# Patient Record
Sex: MALE | Race: WHITE | Employment: OTHER | URBAN - METROPOLITAN AREA
[De-identification: names, ages, dates, MRNs, and addresses within clinical notes are randomized per-mention and may not be internally consistent; named-entity substitution may affect disease eponyms.]

---

## 2023-04-28 ENCOUNTER — TELEPHONE (OUTPATIENT)
Dept: PRIMARY CARE | Facility: CLINIC | Age: 73
End: 2023-04-28
Payer: COMMERCIAL

## 2023-08-08 ENCOUNTER — OFFICE VISIT (OUTPATIENT)
Dept: PRIMARY CARE | Facility: CLINIC | Age: 73
End: 2023-08-08
Payer: COMMERCIAL

## 2023-08-08 VITALS
HEART RATE: 82 BPM | RESPIRATION RATE: 16 BRPM | DIASTOLIC BLOOD PRESSURE: 70 MMHG | SYSTOLIC BLOOD PRESSURE: 145 MMHG | BODY MASS INDEX: 26.03 KG/M2 | HEIGHT: 70 IN | WEIGHT: 181.8 LBS

## 2023-08-08 DIAGNOSIS — Z00.00 ROUTINE GENERAL MEDICAL EXAMINATION AT HEALTH CARE FACILITY: ICD-10-CM

## 2023-08-08 DIAGNOSIS — I10 BENIGN ESSENTIAL HTN: Primary | ICD-10-CM

## 2023-08-08 DIAGNOSIS — Z13.89 ENCOUNTER FOR SCREENING FOR OTHER DISORDER: ICD-10-CM

## 2023-08-08 PROCEDURE — 3078F DIAST BP <80 MM HG: CPT | Performed by: INTERNAL MEDICINE

## 2023-08-08 PROCEDURE — 1170F FXNL STATUS ASSESSED: CPT | Performed by: INTERNAL MEDICINE

## 2023-08-08 PROCEDURE — 3077F SYST BP >= 140 MM HG: CPT | Performed by: INTERNAL MEDICINE

## 2023-08-08 PROCEDURE — 99213 OFFICE O/P EST LOW 20 MIN: CPT | Performed by: INTERNAL MEDICINE

## 2023-08-08 PROCEDURE — 1160F RVW MEDS BY RX/DR IN RCRD: CPT | Performed by: INTERNAL MEDICINE

## 2023-08-08 PROCEDURE — 1126F AMNT PAIN NOTED NONE PRSNT: CPT | Performed by: INTERNAL MEDICINE

## 2023-08-08 PROCEDURE — 1036F TOBACCO NON-USER: CPT | Performed by: INTERNAL MEDICINE

## 2023-08-08 PROCEDURE — 1159F MED LIST DOCD IN RCRD: CPT | Performed by: INTERNAL MEDICINE

## 2023-08-08 PROCEDURE — G0442 ANNUAL ALCOHOL SCREEN 15 MIN: HCPCS | Performed by: INTERNAL MEDICINE

## 2023-08-08 PROCEDURE — G0439 PPPS, SUBSEQ VISIT: HCPCS | Performed by: INTERNAL MEDICINE

## 2023-08-08 RX ORDER — HYDROCHLOROTHIAZIDE 12.5 MG/1
1 TABLET ORAL DAILY
COMMUNITY
Start: 2014-02-19 | End: 2023-08-08 | Stop reason: ALTCHOICE

## 2023-08-08 RX ORDER — LOSARTAN POTASSIUM 50 MG/1
50 TABLET ORAL DAILY
Qty: 90 TABLET | Refills: 1 | Status: SHIPPED | OUTPATIENT
Start: 2023-08-08 | End: 2023-10-27 | Stop reason: SDUPTHER

## 2023-08-08 ASSESSMENT — ACTIVITIES OF DAILY LIVING (ADL)
MANAGING_FINANCES: INDEPENDENT
DOING_HOUSEWORK: INDEPENDENT
DRESSING: INDEPENDENT
TAKING_MEDICATION: INDEPENDENT
BATHING: INDEPENDENT
GROCERY_SHOPPING: INDEPENDENT

## 2023-08-08 ASSESSMENT — PATIENT HEALTH QUESTIONNAIRE - PHQ9
1. LITTLE INTEREST OR PLEASURE IN DOING THINGS: NOT AT ALL
SUM OF ALL RESPONSES TO PHQ9 QUESTIONS 1 AND 2: 0
2. FEELING DOWN, DEPRESSED OR HOPELESS: NOT AT ALL

## 2023-08-08 ASSESSMENT — LIFESTYLE VARIABLES
AUDIT-C TOTAL SCORE: 3
HOW MANY STANDARD DRINKS CONTAINING ALCOHOL DO YOU HAVE ON A TYPICAL DAY: 1 OR 2
HOW OFTEN DO YOU HAVE SIX OR MORE DRINKS ON ONE OCCASION: NEVER
SKIP TO QUESTIONS 9-10: 1
HOW OFTEN DO YOU HAVE A DRINK CONTAINING ALCOHOL: 2-3 TIMES A WEEK

## 2023-08-08 ASSESSMENT — ENCOUNTER SYMPTOMS
OCCASIONAL FEELINGS OF UNSTEADINESS: 0
LOSS OF SENSATION IN FEET: 0

## 2023-08-08 ASSESSMENT — PAIN SCALES - GENERAL: PAINLEVEL: 0-NO PAIN

## 2023-08-08 NOTE — PATIENT INSTRUCTIONS
We are stopping Hydrochlorothiazide.  Begin taking losartan 50 mg one every day with food.     Get blood test done after fasting overnight.  The  lab is on the first floor.  Lab hours are 7 am to 4 pm Mon-Fri and 8am to Noon on Saturday.  No appt is needed.  Orders are entered into the system so you do not need a paper order.     See me again in 3 months.

## 2023-08-08 NOTE — PROGRESS NOTES
"Subjective   Reason for Visit: Jayme Mojica is an 72 y.o. male here for a Medicare Wellness visit.          Reviewed all medications by prescribing practitioner or clinical pharmacist (such as prescriptions, OTCs, herbal therapies and supplements) and documented in the medical record.    HPI    He is on Medicare now.    He had skin cancer surgery  He says his blood pressure \"was through the roof\" when he went for the surgery  He said he had stopped taking his hydrochlorothiazide because he was having frequency of urine    He has lost 10 pounds  He stopped drinking beer     He is still recovering from his elbow replacemnt  He is still doing physical therapy  He has been  Patient Care Team:  Liya Nixon DO as PCP - General  Liya Nixon DO as PCP - [employer] PCP     Review of Systems    Objective   Vitals:  /70 (BP Location: Right arm, Patient Position: Sitting, BP Cuff Size: Adult)   Pulse 82   Resp 16   Ht 1.765 m (5' 9.5\")   Wt 82.5 kg (181 lb 12.8 oz)   BMI 26.46 kg/m²       Physical Exam  Visit Vitals  /70 (BP Location: Right arm, Patient Position: Sitting, BP Cuff Size: Adult)   Pulse 82   Resp 16   Ht 1.765 m (5' 9.5\")   Wt 82.5 kg (181 lb 12.8 oz)   BMI 26.46 kg/m²   Smoking Status Former   BSA 2.01 m²      GEN: NAD  HEENT: normal  NECK: no cervical  adenopathy, no thyroid enlargment  LUNGS: CTAB  CV: reg S1/S2 no murmurs  ABD: soft. Nontender. No mass or organomegaly.   EXT: no leg edema   Assessment/Plan   Problem List Items Addressed This Visit    None    Encounter for preventive health care visit: subsequent medicare wellness  visit completed today.    He is due for labs which were ordered.  At this point in time he wishes to remain a full code.     2. Benign hypertension: discontinued hydrochlorothiazide. Begin losartan. Check CMP and lipids. See me again in 3 months.   "

## 2023-08-25 ENCOUNTER — LAB (OUTPATIENT)
Dept: LAB | Facility: LAB | Age: 73
End: 2023-08-25
Payer: COMMERCIAL

## 2023-08-25 DIAGNOSIS — I10 BENIGN ESSENTIAL HTN: ICD-10-CM

## 2023-08-25 LAB
ALANINE AMINOTRANSFERASE (SGPT) (U/L) IN SER/PLAS: 12 U/L (ref 10–52)
ALBUMIN (G/DL) IN SER/PLAS: 4.3 G/DL (ref 3.4–5)
ALKALINE PHOSPHATASE (U/L) IN SER/PLAS: 105 U/L (ref 33–136)
ANION GAP IN SER/PLAS: 12 MMOL/L (ref 10–20)
ASPARTATE AMINOTRANSFERASE (SGOT) (U/L) IN SER/PLAS: 12 U/L (ref 9–39)
BILIRUBIN TOTAL (MG/DL) IN SER/PLAS: 0.8 MG/DL (ref 0–1.2)
CALCIUM (MG/DL) IN SER/PLAS: 9.2 MG/DL (ref 8.6–10.6)
CARBON DIOXIDE, TOTAL (MMOL/L) IN SER/PLAS: 28 MMOL/L (ref 21–32)
CHLORIDE (MMOL/L) IN SER/PLAS: 107 MMOL/L (ref 98–107)
CHOLESTEROL (MG/DL) IN SER/PLAS: 197 MG/DL (ref 0–199)
CHOLESTEROL IN HDL (MG/DL) IN SER/PLAS: 43.8 MG/DL
CHOLESTEROL/HDL RATIO: 4.5
CREATININE (MG/DL) IN SER/PLAS: 0.86 MG/DL (ref 0.5–1.3)
GFR MALE: >90 ML/MIN/1.73M2
GLUCOSE (MG/DL) IN SER/PLAS: 87 MG/DL (ref 74–99)
LDL: 118 MG/DL (ref 0–99)
POTASSIUM (MMOL/L) IN SER/PLAS: 4.4 MMOL/L (ref 3.5–5.3)
PROTEIN TOTAL: 6.9 G/DL (ref 6.4–8.2)
SODIUM (MMOL/L) IN SER/PLAS: 143 MMOL/L (ref 136–145)
TRIGLYCERIDE (MG/DL) IN SER/PLAS: 177 MG/DL (ref 0–149)
UREA NITROGEN (MG/DL) IN SER/PLAS: 19 MG/DL (ref 6–23)
VLDL: 35 MG/DL (ref 0–40)

## 2023-08-25 PROCEDURE — 80061 LIPID PANEL: CPT

## 2023-08-25 PROCEDURE — 36415 COLL VENOUS BLD VENIPUNCTURE: CPT

## 2023-08-25 PROCEDURE — 80053 COMPREHEN METABOLIC PANEL: CPT

## 2023-10-27 DIAGNOSIS — I10 BENIGN ESSENTIAL HTN: ICD-10-CM

## 2023-10-27 RX ORDER — LOSARTAN POTASSIUM 50 MG/1
50 TABLET ORAL DAILY
Qty: 90 TABLET | Refills: 1 | Status: SHIPPED | OUTPATIENT
Start: 2023-10-27 | End: 2023-11-08 | Stop reason: ALTCHOICE

## 2023-10-27 NOTE — TELEPHONE ENCOUNTER
Med refill    losartan (Cozaar) 50 mg tablet    Take 1 tablet (50 mg) by mouth once daily.     90 days    [facility] - [address]    Will run out before appointment    BN

## 2023-11-08 ENCOUNTER — OFFICE VISIT (OUTPATIENT)
Dept: PRIMARY CARE | Facility: CLINIC | Age: 73
End: 2023-11-08
Payer: COMMERCIAL

## 2023-11-08 VITALS
BODY MASS INDEX: 26.45 KG/M2 | HEIGHT: 70 IN | WEIGHT: 184.8 LBS | DIASTOLIC BLOOD PRESSURE: 80 MMHG | HEART RATE: 67 BPM | SYSTOLIC BLOOD PRESSURE: 150 MMHG

## 2023-11-08 DIAGNOSIS — I10 BENIGN ESSENTIAL HTN: Primary | ICD-10-CM

## 2023-11-08 DIAGNOSIS — R05.3 CHRONIC COUGH: ICD-10-CM

## 2023-11-08 PROCEDURE — 1036F TOBACCO NON-USER: CPT | Performed by: INTERNAL MEDICINE

## 2023-11-08 PROCEDURE — 1159F MED LIST DOCD IN RCRD: CPT | Performed by: INTERNAL MEDICINE

## 2023-11-08 PROCEDURE — 3077F SYST BP >= 140 MM HG: CPT | Performed by: INTERNAL MEDICINE

## 2023-11-08 PROCEDURE — 1160F RVW MEDS BY RX/DR IN RCRD: CPT | Performed by: INTERNAL MEDICINE

## 2023-11-08 PROCEDURE — 99214 OFFICE O/P EST MOD 30 MIN: CPT | Performed by: INTERNAL MEDICINE

## 2023-11-08 PROCEDURE — 1126F AMNT PAIN NOTED NONE PRSNT: CPT | Performed by: INTERNAL MEDICINE

## 2023-11-08 PROCEDURE — 3078F DIAST BP <80 MM HG: CPT | Performed by: INTERNAL MEDICINE

## 2023-11-08 RX ORDER — GINKGO BILOBA LEAF EXTRACT 60 MG
1 CAPSULE ORAL DAILY
COMMUNITY

## 2023-11-08 RX ORDER — ALBUTEROL SULFATE 90 UG/1
2 AEROSOL, METERED RESPIRATORY (INHALATION) EVERY 4 HOURS PRN
Qty: 8.5 G | Refills: 2 | Status: SHIPPED | OUTPATIENT
Start: 2023-11-08 | End: 2024-11-07

## 2023-11-08 RX ORDER — MULTIVITAMIN
TABLET ORAL
COMMUNITY
Start: 2004-02-25

## 2023-11-08 RX ORDER — LOSARTAN POTASSIUM 100 MG/1
100 TABLET ORAL DAILY
Qty: 90 TABLET | Refills: 1 | Status: SHIPPED | OUTPATIENT
Start: 2023-11-08 | End: 2024-04-09 | Stop reason: SDUPTHER

## 2023-11-08 ASSESSMENT — PATIENT HEALTH QUESTIONNAIRE - PHQ9
1. LITTLE INTEREST OR PLEASURE IN DOING THINGS: NOT AT ALL
2. FEELING DOWN, DEPRESSED OR HOPELESS: NOT AT ALL
SUM OF ALL RESPONSES TO PHQ9 QUESTIONS 1 AND 2: 0

## 2023-11-08 ASSESSMENT — PAIN SCALES - GENERAL: PAINLEVEL: 0-NO PAIN

## 2023-11-08 NOTE — PATIENT INSTRUCTIONS
Begin taking two losartan 50 mg tabs at one time daily for total dose of 100 mg.    I sent in for the 100 mg tab. When you run out of the 50 mg dose, begin taking just one of the 100 mg tabs daily.    See me again in March for follow up on blood pressure.

## 2023-11-08 NOTE — PROGRESS NOTES
"Subjective   Jayme Mojica is a 73 y.o. male who presents for Follow-up (Patient here for follow up.).    Last here in August for Medicare Wellness visit    We started losartan and stopped hydrochlorothiazide due to too frequent urination    We reviewed his labs: his glucose was 87,  total chol was 197, improved from 237 in 2020 and LDL was 118 improved from 145  He continues to abstain from beer    He saw Dr Peraza for his melanoma in August    He is still recovering from his elbow replacement from his accident  Does PT with Kyle Milligan still; is slow to recover   Some of his finger joints are fused  Is working on improving rom in wrist, he has good ROM in his shoulder      Review of Systems  No chest pain  He has some chest congestion  He would like an inhaler as he uses it periodically  He is still able to be as active as he wants to be with hiking    Objective   /80 (BP Location: Right arm, Patient Position: Sitting, BP Cuff Size: Large adult)   Pulse 67   Ht 1.765 m (5' 9.5\")   Wt 83.8 kg (184 lb 12.8 oz)   BMI 26.90 kg/m²    Physical Exam  Visit Vitals  /80 (BP Location: Right arm, Patient Position: Sitting, BP Cuff Size: Large adult)   Pulse 67   Ht 1.765 m (5' 9.5\")   Wt 83.8 kg (184 lb 12.8 oz)   BMI 26.90 kg/m²   Smoking Status Former   BSA 2.03 m²      GEN: NAD  HEENT: normal  NECK: no adenopathy, no thyroid enlargment  LUNGS: CTAB  CV: reg S1/S2 no murmurs  EXT: no leg edema   Assessment/Plan   Problem List Items Addressed This Visit    None  Visit Diagnoses       Benign essential HTN    -  Primary; increase losartan to 100 mg. He has been tolerating it well.  He is checking his blood pressures at home and states his systolics have been in 140's     Relevant Medications    losartan (Cozaar) 100 mg tablet    Chronic cough        Relevant Medications    albuterol (ProAir HFA) 90 mcg/actuation inhaler    See me in March               "

## 2024-04-09 DIAGNOSIS — I10 BENIGN ESSENTIAL HTN: ICD-10-CM

## 2024-04-09 RX ORDER — LOSARTAN POTASSIUM 100 MG/1
100 TABLET ORAL DAILY
Qty: 90 TABLET | Refills: 0 | Status: SHIPPED | OUTPATIENT
Start: 2024-04-09 | End: 2024-05-07 | Stop reason: SDUPTHER

## 2024-04-09 NOTE — TELEPHONE ENCOUNTER
Pt needs a refill on Losartan.   [facility] on [facility] in [place]. [phone]

## 2024-05-07 ENCOUNTER — OFFICE VISIT (OUTPATIENT)
Dept: PRIMARY CARE | Facility: CLINIC | Age: 74
End: 2024-05-07
Payer: COMMERCIAL

## 2024-05-07 VITALS
HEART RATE: 80 BPM | BODY MASS INDEX: 26.26 KG/M2 | DIASTOLIC BLOOD PRESSURE: 73 MMHG | HEIGHT: 70 IN | SYSTOLIC BLOOD PRESSURE: 148 MMHG | WEIGHT: 183.4 LBS | RESPIRATION RATE: 18 BRPM

## 2024-05-07 DIAGNOSIS — I10 BENIGN ESSENTIAL HTN: Primary | ICD-10-CM

## 2024-05-07 PROCEDURE — 1159F MED LIST DOCD IN RCRD: CPT | Performed by: INTERNAL MEDICINE

## 2024-05-07 PROCEDURE — 1126F AMNT PAIN NOTED NONE PRSNT: CPT | Performed by: INTERNAL MEDICINE

## 2024-05-07 PROCEDURE — 3078F DIAST BP <80 MM HG: CPT | Performed by: INTERNAL MEDICINE

## 2024-05-07 PROCEDURE — 3077F SYST BP >= 140 MM HG: CPT | Performed by: INTERNAL MEDICINE

## 2024-05-07 PROCEDURE — 99213 OFFICE O/P EST LOW 20 MIN: CPT | Performed by: INTERNAL MEDICINE

## 2024-05-07 RX ORDER — LOSARTAN POTASSIUM 100 MG/1
100 TABLET ORAL DAILY
Qty: 90 TABLET | Refills: 1 | Status: SHIPPED | OUTPATIENT
Start: 2024-05-07

## 2024-05-07 ASSESSMENT — PAIN SCALES - GENERAL: PAINLEVEL: 0-NO PAIN

## 2024-05-07 NOTE — PROGRESS NOTES
"Subjective   Jayme Mojica is a 73 y.o. male who presents for Follow-up.    He says he feels a little lethargic with losartan but he is taking it daily and he is glad that his BP are mproving     140/66 Ifeanyi  120/73 Feb  132/82 March : he tried cutting down the losartan to 75 mg per day but he had some higher bp's    He has an inhaler very rarely    He sees derm every 3 months    Denies chest pain  Denies shortness of breath  No leg swelling    Review of Systems    Objective   /73   Pulse 80   Resp 18   Ht 1.765 m (5' 9.5\")   Wt 83.2 kg (183 lb 6.4 oz)   BMI 26.70 kg/m²    Physical Exam  Visit Vitals  /73   Pulse 80   Resp 18   Ht 1.765 m (5' 9.5\")   Wt 83.2 kg (183 lb 6.4 oz)   BMI 26.70 kg/m²   Smoking Status Former   BSA 2.02 m²      GEN: NAD  HEENT: normal  NECK: no adenopathy, no thyroid enlargment  LUNGS: CTAB  CV: reg S1/S2 no murmurs  EXT: no leg edema   Assessment/Plan   Problem List Items Addressed This Visit    None  Visit Diagnoses       Benign essential HTN    -  Primary remain on losartan 100 mg . Refill sent.     Relevant Medications    losartan (Cozaar) 100 mg tablet    Other Relevant Orders    Comprehensive Metabolic Panel    Lipid Panel    CBC and Auto Differential    See me in September for Medicare Wellness visit.                "

## 2024-05-07 NOTE — PATIENT INSTRUCTIONS
See me again in September for Medicare Wellness visit.     There are refills for your losartan at the pharmacy, so call the pharmacy , not us when you need a refill.     Close to next visit.   Get blood test done after fasting overnight.  The  lab is on the first floor.  Lab hours are 7 am to 4 pm Mon-Fri and 8am to Noon on Saturday.  No appt is needed.  Orders are entered into the system so you do not need a paper order.

## 2024-09-10 ENCOUNTER — APPOINTMENT (OUTPATIENT)
Dept: PRIMARY CARE | Facility: CLINIC | Age: 74
End: 2024-09-10
Payer: COMMERCIAL

## 2024-10-28 DIAGNOSIS — I10 BENIGN ESSENTIAL HTN: ICD-10-CM

## 2024-10-28 RX ORDER — LOSARTAN POTASSIUM 100 MG/1
100 TABLET ORAL DAILY
Qty: 90 TABLET | Refills: 3 | Status: SHIPPED | OUTPATIENT
Start: 2024-10-28

## 2024-11-08 ENCOUNTER — APPOINTMENT (OUTPATIENT)
Dept: PRIMARY CARE | Facility: CLINIC | Age: 74
End: 2024-11-08
Payer: COMMERCIAL

## 2024-11-27 ENCOUNTER — APPOINTMENT (OUTPATIENT)
Dept: PRIMARY CARE | Facility: CLINIC | Age: 74
End: 2024-11-27
Payer: COMMERCIAL

## 2024-11-27 VITALS
HEIGHT: 70 IN | RESPIRATION RATE: 16 BRPM | WEIGHT: 187.6 LBS | SYSTOLIC BLOOD PRESSURE: 132 MMHG | HEART RATE: 68 BPM | DIASTOLIC BLOOD PRESSURE: 70 MMHG | BODY MASS INDEX: 26.86 KG/M2

## 2024-11-27 DIAGNOSIS — Z71.89 CARDIAC RISK COUNSELING: ICD-10-CM

## 2024-11-27 DIAGNOSIS — Z13.89 ENCOUNTER FOR SCREENING FOR OTHER DISORDER: ICD-10-CM

## 2024-11-27 DIAGNOSIS — Z00.00 ENCOUNTER FOR PREVENTATIVE ADULT HEALTH CARE EXAMINATION: ICD-10-CM

## 2024-11-27 DIAGNOSIS — Z00.00 ROUTINE GENERAL MEDICAL EXAMINATION AT HEALTH CARE FACILITY: Primary | ICD-10-CM

## 2024-11-27 DIAGNOSIS — I10 BENIGN ESSENTIAL HTN: ICD-10-CM

## 2024-11-27 PROCEDURE — 3008F BODY MASS INDEX DOCD: CPT | Performed by: INTERNAL MEDICINE

## 2024-11-27 PROCEDURE — 1123F ACP DISCUSS/DSCN MKR DOCD: CPT | Performed by: INTERNAL MEDICINE

## 2024-11-27 PROCEDURE — 1170F FXNL STATUS ASSESSED: CPT | Performed by: INTERNAL MEDICINE

## 2024-11-27 PROCEDURE — G0442 ANNUAL ALCOHOL SCREEN 15 MIN: HCPCS | Performed by: INTERNAL MEDICINE

## 2024-11-27 PROCEDURE — 1159F MED LIST DOCD IN RCRD: CPT | Performed by: INTERNAL MEDICINE

## 2024-11-27 PROCEDURE — 99397 PER PM REEVAL EST PAT 65+ YR: CPT | Performed by: INTERNAL MEDICINE

## 2024-11-27 PROCEDURE — 1036F TOBACCO NON-USER: CPT | Performed by: INTERNAL MEDICINE

## 2024-11-27 PROCEDURE — G0439 PPPS, SUBSEQ VISIT: HCPCS | Performed by: INTERNAL MEDICINE

## 2024-11-27 PROCEDURE — 1126F AMNT PAIN NOTED NONE PRSNT: CPT | Performed by: INTERNAL MEDICINE

## 2024-11-27 PROCEDURE — G0446 INTENS BEHAVE THER CARDIO DX: HCPCS | Performed by: INTERNAL MEDICINE

## 2024-11-27 PROCEDURE — 1160F RVW MEDS BY RX/DR IN RCRD: CPT | Performed by: INTERNAL MEDICINE

## 2024-11-27 PROCEDURE — 3078F DIAST BP <80 MM HG: CPT | Performed by: INTERNAL MEDICINE

## 2024-11-27 PROCEDURE — 3075F SYST BP GE 130 - 139MM HG: CPT | Performed by: INTERNAL MEDICINE

## 2024-11-27 PROCEDURE — 99213 OFFICE O/P EST LOW 20 MIN: CPT | Performed by: INTERNAL MEDICINE

## 2024-11-27 PROCEDURE — 1158F ADVNC CARE PLAN TLK DOCD: CPT | Performed by: INTERNAL MEDICINE

## 2024-11-27 RX ORDER — LOSARTAN POTASSIUM 100 MG/1
100 TABLET ORAL DAILY
Qty: 90 TABLET | Refills: 3 | Status: SHIPPED | OUTPATIENT
Start: 2024-11-27

## 2024-11-27 ASSESSMENT — ACTIVITIES OF DAILY LIVING (ADL)
GROCERY_SHOPPING: INDEPENDENT
MANAGING_FINANCES: INDEPENDENT
TAKING_MEDICATION: INDEPENDENT
BATHING: INDEPENDENT
DOING_HOUSEWORK: INDEPENDENT
DRESSING: INDEPENDENT

## 2024-11-27 ASSESSMENT — PATIENT HEALTH QUESTIONNAIRE - PHQ9
2. FEELING DOWN, DEPRESSED OR HOPELESS: NOT AT ALL
SUM OF ALL RESPONSES TO PHQ9 QUESTIONS 1 AND 2: 0
2. FEELING DOWN, DEPRESSED OR HOPELESS: NOT AT ALL
SUM OF ALL RESPONSES TO PHQ9 QUESTIONS 1 AND 2: 0
1. LITTLE INTEREST OR PLEASURE IN DOING THINGS: NOT AT ALL
1. LITTLE INTEREST OR PLEASURE IN DOING THINGS: NOT AT ALL

## 2024-11-27 ASSESSMENT — ENCOUNTER SYMPTOMS
DIARRHEA: 0
CONSTIPATION: 0
SHORTNESS OF BREATH: 0

## 2024-11-27 ASSESSMENT — LIFESTYLE VARIABLES
HOW MANY STANDARD DRINKS CONTAINING ALCOHOL DO YOU HAVE ON A TYPICAL DAY: 1 OR 2
HOW OFTEN DO YOU HAVE A DRINK CONTAINING ALCOHOL: 2-3 TIMES A WEEK
HOW OFTEN DO YOU HAVE SIX OR MORE DRINKS ON ONE OCCASION: NEVER
AUDIT-C TOTAL SCORE: 3
SKIP TO QUESTIONS 9-10: 1

## 2024-11-27 ASSESSMENT — PAIN SCALES - GENERAL: PAINLEVEL_OUTOF10: 0-NO PAIN

## 2024-11-27 NOTE — PATIENT INSTRUCTIONS
See me again in 6 mo for BP check.     Get blood test done after fasting in the next few weeks.

## 2024-11-27 NOTE — PROGRESS NOTES
Subjective   Reason for Visit: Jayme Mojica is an 74 y.o. male here for a Medicare Wellness visit.          Reviewed all medications by prescribing practitioner or clinical pharmacist (such as prescriptions, OTCs, herbal therapies and supplements) and documented in the medical record.    HPI    He is finishing up on a 2 year course of every 3 mo follow up for skin cancer    Has been swimming 2 days a week   He walks with his dogs every day, up and down hills   He feels he is in pretty good physical shape for his age.     He said he gets congestion around this time of the year that he attributes to leaf mold.     His bp was high today. His bp at dentist was 155/80 yesderday  Says his SBP at home are 130-140's    Depression screening was completed today using PHQ-2 and was negative.  Falls risk was assessed today. Pt does not use ambulatory aids.   Home safety measures were addressed today.   Functional status was addressed and no concerns per patient today.  Nutritional status completed today.  No concerns  Alcohol screening was performed today taking 5 min of time.   Pt is independent with finances, medications and transportation.   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       The 10-year ASCVD risk score (Agnes ISABEL, et al., 2019) is: 29.1%    Values used to calculate the score:      Age: 74 years      Sex: Male      Is Non- : No      Diabetic: No      Tobacco smoker: No      Systolic Blood Pressure: 132 mmHg      Is BP treated: Yes      HDL Cholesterol: 43.8 mg/dL      Total Cholesterol: 197 mg/dL     For about 15 minutes, cardiovascular risks was discussed . This patient's  10 year CV risk is   42  %. If needed, lifestyle modifications recommended, including nutritional choices, exercise, and elimination of habits contributing to risk.  We agreed on a plan to reduce the current cardiovascular risk based on the above discussion as needed.  Advanced Care Planning (  "including a Living will, Healthcare POA, as well as specific end of life choices or directives) , was discussed with the patient .   The Pt. Has Living Will/HCPOA      Patient Care Team:  Liya Nixon DO as PCP - General  iLya Nixon DO as PCP - [employer] PCP     Review of Systems   Respiratory:  Negative for shortness of breath.    Cardiovascular:  Negative for chest pain.   Gastrointestinal:  Negative for constipation and diarrhea.       Objective   Vitals:  /70 (BP Location: Right arm, Patient Position: Sitting, BP Cuff Size: Large adult)   Pulse 68   Resp 16   Ht 1.765 m (5' 9.5\")   Wt 85.1 kg (187 lb 9.6 oz)   BMI 27.31 kg/m²       Physical Exam  Eyes:      Extraocular Movements: Extraocular movements intact.      Conjunctiva/sclera: Conjunctivae normal.      Pupils: Pupils are equal, round, and reactive to light.   Cardiovascular:      Rate and Rhythm: Normal rate and regular rhythm.      Heart sounds: Normal heart sounds.   Pulmonary:      Breath sounds: Normal breath sounds.   Abdominal:      General: Abdomen is flat. Bowel sounds are normal.      Palpations: Abdomen is soft. There is no mass.      Tenderness: There is no abdominal tenderness.   Musculoskeletal:      Left elbow: Decreased range of motion.      Comments: Had Left elbow replacement . Can't fully extend his elbow. CHECK BP IN RIGHT ARM ONLY DUE TO THIS.          Assessment/Plan   Problem List Items Addressed This Visit    None  Visit Diagnoses         Codes    Routine general medical examination at [facility] care facility   / Medicare Wellness visit  -  annual physical and medicare wellness visit completed today  Need to recheck lipids for CV risk screen assessment  Refuses colonoscopy     Relevant Orders    1 Year Follow Up In [facility] - PCP - Wellness Exam  In addition to Medicare Wellness visit, the following issues were addressed with separate E/M and treatment decision such as refills or  tests " were ordered:     Benign essential HTN    : check BP in Right arm only due to Left elbow replacement and altered anatomy.      Relevant Medications    losartan (Cozaar) 100 mg tablet    Other Relevant Orders    Comprehensive Metabolic Panel    Lipid Panel    TSH with reflex to Free T4 if abnormal    CBC

## 2025-02-10 DIAGNOSIS — R05.3 CHRONIC COUGH: ICD-10-CM

## 2025-02-11 RX ORDER — ALBUTEROL SULFATE 90 UG/1
2 INHALANT RESPIRATORY (INHALATION) EVERY 4 HOURS PRN
Qty: 8.5 G | Refills: 2 | Status: SHIPPED | OUTPATIENT
Start: 2025-02-11 | End: 2026-02-11

## 2025-06-02 ENCOUNTER — APPOINTMENT (OUTPATIENT)
Dept: PRIMARY CARE | Facility: CLINIC | Age: 75
End: 2025-06-02
Payer: COMMERCIAL

## 2025-06-02 VITALS
HEIGHT: 70 IN | BODY MASS INDEX: 26.28 KG/M2 | RESPIRATION RATE: 16 BRPM | DIASTOLIC BLOOD PRESSURE: 80 MMHG | HEART RATE: 80 BPM | SYSTOLIC BLOOD PRESSURE: 130 MMHG | WEIGHT: 183.6 LBS

## 2025-06-02 DIAGNOSIS — Z00.00 ROUTINE ADULT HEALTH MAINTENANCE: ICD-10-CM

## 2025-06-02 DIAGNOSIS — I10 BENIGN ESSENTIAL HTN: Primary | ICD-10-CM

## 2025-06-02 DIAGNOSIS — R00.1 BRADYCARDIA: ICD-10-CM

## 2025-06-02 DIAGNOSIS — R53.83 OTHER FATIGUE: ICD-10-CM

## 2025-06-02 DIAGNOSIS — E78.2 MIXED HYPERLIPIDEMIA: ICD-10-CM

## 2025-06-02 DIAGNOSIS — R53.83 FATIGUE, UNSPECIFIED TYPE: ICD-10-CM

## 2025-06-02 PROCEDURE — 3075F SYST BP GE 130 - 139MM HG: CPT | Performed by: INTERNAL MEDICINE

## 2025-06-02 PROCEDURE — 3078F DIAST BP <80 MM HG: CPT | Performed by: INTERNAL MEDICINE

## 2025-06-02 PROCEDURE — G2211 COMPLEX E/M VISIT ADD ON: HCPCS | Performed by: INTERNAL MEDICINE

## 2025-06-02 PROCEDURE — 1036F TOBACCO NON-USER: CPT | Performed by: INTERNAL MEDICINE

## 2025-06-02 PROCEDURE — 1126F AMNT PAIN NOTED NONE PRSNT: CPT | Performed by: INTERNAL MEDICINE

## 2025-06-02 PROCEDURE — 3008F BODY MASS INDEX DOCD: CPT | Performed by: INTERNAL MEDICINE

## 2025-06-02 PROCEDURE — 99214 OFFICE O/P EST MOD 30 MIN: CPT | Performed by: INTERNAL MEDICINE

## 2025-06-02 ASSESSMENT — ENCOUNTER SYMPTOMS
PALPITATIONS: 0
COUGH: 0
SHORTNESS OF BREATH: 0
DIARRHEA: 0
ABDOMINAL PAIN: 0
CONSTIPATION: 0

## 2025-06-02 ASSESSMENT — PAIN SCALES - GENERAL: PAINLEVEL_OUTOF10: 0-NO PAIN

## 2025-06-02 NOTE — PATIENT INSTRUCTIONS
Please get blood test done after fasting overnight.   Make an appointment with the lab .  Open Mon - Fri 7 am 5 pm.    Please check blood pressure once or twice a week.     For now, no blood pressure medication.     See me again in Nov or Dec for Medicare Wellness.

## 2025-06-02 NOTE — PROGRESS NOTES
Subjective   Patient ID: Jayme Mojica is a 74 y.o. male who presents for follow up.    HPI     Patient presents today for a follow-up.    He is no longer taking his BP medication as it was making him feel fatigued. He did ok on the 50 mg dose losartan but when was increased to 100 mg  he became extremely fatigued. 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  He said he was taking naps during the day . When he stopped the med he felt almost immediately better.    He had some blood pressures from home ; SBP ran 140-160   Diastolics were 60-70  He saw that his heart rate went down more when he as on losartan 100, rate was in 50's.  Denies constipation, diarrhea, abdominal pain, chest pain, SOB, palpitations, leg swelling.   He has some mild neuropathy symptoms.      Latest Reference Range & Units 09/15/20 09:47 08/25/23 08:55   GLUCOSE 74 - 99 mg/dL 103 (H) 87   SODIUM 136 - 145 mmol/L 140 143   POTASSIUM 3.5 - 5.3 mmol/L 4.2 4.4   CHLORIDE 98 - 107 mmol/L 103 107   Bicarbonate 21 - 32 mmol/L 27 28   Anion Gap 10 - 20 mmol/L 14 12   Blood Urea Nitrogen 6 - 23 mg/dL 18 19   Creatinine 0.50 - 1.30 mg/dL 1.05 0.86   Calcium 8.6 - 10.6 mg/dL 9.4 9.2   Albumin 3.4 - 5.0 g/dL 4.1 4.3   Alkaline Phosphatase 33 - 136 U/L 94 105   ALT 10 - 52 U/L 24 12   AST 9 - 39 U/L 16 12   Bilirubin Total 0.0 - 1.2 mg/dL 0.8 0.8   HDL CHOLESTEROL mg/dL 51.1 43.8   Cholesterol/HDL Ratio  4.6 4.5   LDL Calculated 0 - 99 mg/dL 145 (H) 118 (H)   VLDL 0 - 40 mg/dL 41 (H) 35   TRIGLYCERIDES 0 - 149 mg/dL 205 (H) 177 (H)   Non HDL Cholesterol mg/dL 186    Total Protein 6.4 - 8.2 g/dL 6.8 6.9   CHOLESTEROL 0 - 199 mg/dL 237 (H) 197   GFR MALE >90 mL/min/1.73m2  >90   Prostate Specific Antigen,Screen 0.00 - 4.00 ng/mL 0.61    WBC 4.4 - 11.3 x10E9/L 8.1    nRBC 0.0 - 0.0 /100 WBC 0.0    RBC 4.50 - 5.90 x10E12/L 4.63    HEMOGLOBIN 13.5 - 17.5 g/dL 14.1    HEMATOCRIT 41.0 - 52.0 % 42.4    MCV 80  "- 100 fL 92    MCHC 32.0 - 36.0 g/dL 33.3    RED CELL DISTRIBUTION WIDTH 11.5 - 14.5 % 13.2    Platelets 150 - 450 x10E9/L 314    Neutrophils % 40.0 - 80.0 % 57.7    Immature Granulocytes %, Automated 0.0 - 0.9 % 0.2    Lymphocytes % 13.0 - 44.0 % 33.5    Monocytes % 2.0 - 10.0 % 6.1    Eosinophils % 0.0 - 6.0 % 1.9    Basophils % 0.0 - 2.0 % 0.6    Neutrophils Absolute 1.20 - 7.70 x10E9/L 4.66    Lymphocytes Absolute 1.20 - 4.80 x10E9/L 2.71    Monocytes Absolute 0.10 - 1.00 x10E9/L 0.49    Eosinophils Absolute 0.00 - 0.70 x10E9/L 0.15    Basophils Absolute 0.00 - 0.10 x10E9/L 0.05    GLOMERULAR FILTRATION RATE- >60 mL/min/1.73m2 >60    GLOMERULAR FILTRATION RATE-NON  >60 mL/min/1.73m2 >60    (H): Data is abnormally high    Review of Systems   HENT:  Positive for congestion.    Respiratory:  Negative for cough and shortness of breath.    Cardiovascular:  Negative for chest pain, palpitations and leg swelling.   Gastrointestinal:  Negative for abdominal pain, constipation and diarrhea.       Current Medications[1]    Objective   /80   Pulse 80   Resp 16   Ht 1.765 m (5' 9.5\")   Wt 83.3 kg (183 lb 9.6 oz)   BMI 26.72 kg/m²     Physical Exam  Constitutional:       Appearance: Normal appearance.   HENT:      Mouth/Throat:      Mouth: Mucous membranes are moist.      Pharynx: Oropharynx is clear.   Eyes:      Conjunctiva/sclera: Conjunctivae normal.      Pupils: Pupils are equal, round, and reactive to light.   Cardiovascular:      Rate and Rhythm: Normal rate and regular rhythm.      Heart sounds: Normal heart sounds.   Pulmonary:      Effort: Pulmonary effort is normal.      Breath sounds: Normal breath sounds.   Abdominal:      General: Bowel sounds are normal. There is no distension.      Palpations: Abdomen is soft. There is no mass.      Tenderness: There is no abdominal tenderness.   Lymphadenopathy:      Cervical: No cervical adenopathy.   Skin:     General: Skin is " warm and dry.   Neurological:      General: No focal deficit present.         Assessment/Plan   Problem List Items Addressed This Visit              I am the internal medicine physician who provides ongoing chronic medical care for this physician.  This includes management during office visits and also in between office visits.     Benign essential HTN - Primary    BP on intake 161/74  BP on recheck 130/80  He stopped taking his losartan as he states it was making him feel fatigued. His BP readings have been 150/160's/70' at home.   Will continue to hold blood pressure medication at this time and monitor at home.   Labs ordered today.            Relevant Orders    CBC    TSH with reflex to Free T4 if abnormal    Bradycardia: rate 80 today. Check EKG at next visit.   Mixed hyperlipidemia    Not currently on any medication therapy.   Labs ordered today.            Relevant Orders    Comprehensive Metabolic Panel    Lipid Panel       Symptoms and Signs    Fatigue    He felt increased fatigue while taking BP medications.   Has improved since stopping medications.   Labs ordered today.            Relevant Orders    CBC    TSH with reflex to Free T4 if abnormal     Other Visit Diagnoses         Routine adult health maintenance        Relevant Orders    Follow Up In [facility] - PCP - Medicare Annual              Please return to see me in 5-6 months for a Medicare Wellness    Scribe Attestation  By signing my name below, IFelicia Scribasad   attest that this documentation has been prepared under the direction and in the presence of Liya Nixon DO.         [1]   Current Outpatient Medications:     albuterol (ProAir HFA) 90 mcg/actuation inhaler, Inhale 2 puffs every 4 hours if needed for wheezing or shortness of breath., Disp: 8.5 g, Rfl: 2    ginseng 100 mg capsule, Take 1 capsule by mouth once daily., Disp: , Rfl:     multivitamin tablet, Take by mouth., Disp: , Rfl:

## 2025-06-02 NOTE — ASSESSMENT & PLAN NOTE
BP on intake 161/74  BP on recheck 130/80  He stopped taking his losartan as he states it was making him feel fatigued. His BP readings have been 150/160's/70' at home.   Will continue to hold blood pressure medication at this time and monitor at home.   Labs ordered today.

## 2025-06-02 NOTE — ASSESSMENT & PLAN NOTE
He felt increased fatigue while taking BP medications.   Has improved since stopping medications.   Labs ordered today.

## 2025-11-25 ENCOUNTER — APPOINTMENT (OUTPATIENT)
Dept: PRIMARY CARE | Facility: CLINIC | Age: 75
End: 2025-11-25
Payer: MEDICARE